# Patient Record
Sex: FEMALE | Race: WHITE | NOT HISPANIC OR LATINO | Employment: STUDENT | URBAN - METROPOLITAN AREA
[De-identification: names, ages, dates, MRNs, and addresses within clinical notes are randomized per-mention and may not be internally consistent; named-entity substitution may affect disease eponyms.]

---
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
## 2019-01-31 ENCOUNTER — OFFICE VISIT (OUTPATIENT)
Dept: URGENT CARE | Facility: CLINIC | Age: 19
End: 2019-01-31
Payer: MEDICAID

## 2019-01-31 VITALS
HEART RATE: 82 BPM | TEMPERATURE: 99 F | BODY MASS INDEX: 23.05 KG/M2 | HEIGHT: 64 IN | SYSTOLIC BLOOD PRESSURE: 112 MMHG | DIASTOLIC BLOOD PRESSURE: 72 MMHG | WEIGHT: 135 LBS

## 2019-01-31 DIAGNOSIS — S99.922A FOOT INJURY, LEFT, INITIAL ENCOUNTER: Primary | ICD-10-CM

## 2019-01-31 DIAGNOSIS — L03.032 CELLULITIS OF SMALL TOE OF LEFT FOOT: ICD-10-CM

## 2019-01-31 LAB
B-HCG UR QL: NEGATIVE
CTP QC/QA: YES

## 2019-01-31 PROCEDURE — 81025 POCT URINE PREGNANCY: ICD-10-PCS | Mod: S$GLB,,, | Performed by: FAMILY MEDICINE

## 2019-01-31 PROCEDURE — 99214 OFFICE O/P EST MOD 30 MIN: CPT | Mod: S$GLB,,, | Performed by: FAMILY MEDICINE

## 2019-01-31 PROCEDURE — 73660 X-RAY EXAM OF TOE(S): CPT | Mod: LT,S$GLB,, | Performed by: RADIOLOGY

## 2019-01-31 PROCEDURE — 99214 PR OFFICE/OUTPT VISIT, EST, LEVL IV, 30-39 MIN: ICD-10-PCS | Mod: S$GLB,,, | Performed by: FAMILY MEDICINE

## 2019-01-31 PROCEDURE — 73660 XR TOE 2 OR MORE VIEWS LEFT: ICD-10-PCS | Mod: LT,S$GLB,, | Performed by: RADIOLOGY

## 2019-01-31 PROCEDURE — 81025 URINE PREGNANCY TEST: CPT | Mod: S$GLB,,, | Performed by: FAMILY MEDICINE

## 2019-01-31 RX ORDER — CEPHALEXIN 500 MG/1
500 CAPSULE ORAL EVERY 12 HOURS
Qty: 40 CAPSULE | Refills: 0 | Status: SHIPPED | OUTPATIENT
Start: 2019-01-31 | End: 2019-02-10

## 2019-01-31 NOTE — LETTER
January 31, 2019      [facility] [address], [address]  [facility] [address]  Phone: [phone]  Fax: [phone]       Patient: Fariba Johnson   YOB: 2000  Date of Visit: 01/31/2019    To Whom It May Concern:    Jules Johnson  was at [facility] on 01/31/2019. Please excuse her from work/school from 01/31/19-02/01/19. If you have any questions or concerns, or if I can be of further assistance, please do not hesitate to contact me.    Sincerely,    Suzette Dietrich MA

## 2019-02-01 NOTE — PROGRESS NOTES
"Subjective:       Patient ID: Fariba Johnson is a 18 y.o. female.    Vitals:  height is 5' 4" (1.626 m) and weight is 61.2 kg (135 lb). Her oral temperature is 99.4 °F (37.4 °C). Her blood pressure is 112/72 and her pulse is 82.     Chief Complaint: Toe Pain    Fariba was walking in a yard last night and her foot got stuck and she hurt her pinky toe on her left foot.      Toe Pain    The incident occurred 12 to 24 hours ago. The incident occurred in the yard. The injury mechanism was a direct blow. The pain is present in the left foot. The quality of the pain is described as aching. The pain is at a severity of 7/10. The pain is mild. The pain has been constant since onset. Associated symptoms include an inability to bear weight. It is unknown if a foreign body is present. The symptoms are aggravated by movement and palpation. She has tried nothing for the symptoms.       Constitution: Negative for fatigue.   HENT: Negative for facial swelling and facial trauma.    Neck: Negative for neck stiffness.   Cardiovascular: Negative for chest trauma.   Eyes: Negative for eye trauma, double vision and blurred vision.   Gastrointestinal: Negative for abdominal trauma, abdominal pain and rectal bleeding.   Genitourinary: Negative for hematuria, missed menses, genital trauma and pelvic pain.   Musculoskeletal: Positive for pain and trauma. Negative for joint swelling and abnormal ROM of joint.   Skin: Negative for color change, wound, abrasion, laceration and bruising.   Neurological: Negative for dizziness, history of vertigo, light-headedness, coordination disturbances, altered mental status and loss of consciousness.   Hematologic/Lymphatic: Negative for history of bleeding disorder.   Psychiatric/Behavioral: Negative for altered mental status.       Objective:      Physical Exam   Constitutional: She is oriented to person, place, and time. She appears well-developed and well-nourished. She is cooperative.  Non-toxic " appearance. She does not appear ill. No distress.   HENT:   Head: Normocephalic and atraumatic.   Right Ear: Hearing, tympanic membrane, external ear and ear canal normal.   Left Ear: Hearing, tympanic membrane, external ear and ear canal normal.   Nose: Nose normal. No mucosal edema, rhinorrhea or nasal deformity. No epistaxis. Right sinus exhibits no maxillary sinus tenderness and no frontal sinus tenderness. Left sinus exhibits no maxillary sinus tenderness and no frontal sinus tenderness.   Mouth/Throat: Uvula is midline, oropharynx is clear and moist and mucous membranes are normal. No trismus in the jaw. Normal dentition. No uvula swelling. No posterior oropharyngeal erythema.   Eyes: Conjunctivae and lids are normal. Right eye exhibits no discharge. Left eye exhibits no discharge. No scleral icterus.   Sclera clear bilat   Neck: Trachea normal, normal range of motion, full passive range of motion without pain and phonation normal. Neck supple.   Cardiovascular: Normal rate, regular rhythm, normal heart sounds, intact distal pulses and normal pulses.   Pulmonary/Chest: Effort normal and breath sounds normal. No respiratory distress.   Abdominal: Soft. Normal appearance and bowel sounds are normal. She exhibits no distension, no pulsatile midline mass and no mass. There is no tenderness.   Musculoskeletal: Normal range of motion. She exhibits no edema or deformity.   Mild erythema and induration of right 5th toe small open wound at medial nail fold.  No deformity no purulence noted   Neurological: She is alert and oriented to person, place, and time. She exhibits normal muscle tone. Coordination normal.   Skin: Skin is warm, dry and intact. She is not diaphoretic. No pallor.   Psychiatric: She has a normal mood and affect. Her speech is normal and behavior is normal. Judgment and thought content normal. Cognition and memory are normal.   Nursing note and vitals reviewed.      Assessment:       1. Foot injury,  left, initial encounter    2. Cellulitis of small toe of left foot        Plan:         Foot injury, left, initial encounter  -     POCT urine pregnancy  -     X-Ray Toe 2 or More Views Left; Future; Expected date: 01/31/2019    Cellulitis of small toe of left foot    Other orders  -     cephALEXin (KEFLEX) 500 MG capsule; Take 1 capsule (500 mg total) by mouth every 12 (twelve) hours. for 10 days  Dispense: 40 capsule; Refill: 0     X-ray of toe shows no fracture or dislocation.  UPT negative.  Cephalexin times 10 days cellulitis precautions follow-up if no improvement 3-5 days.

## 2019-02-01 NOTE — PATIENT INSTRUCTIONS
Discharge Instructions for Cellulitis  You have been diagnosed with cellulitis. This is an infection in the deepest layer of the skin. In some cases, the infection also affects the muscle. Cellulitis is caused by bacteria. The bacteria can enter the body through broken skin. This can happen with a cut, scratch, animal bite, or an insect bite that has been scratched. You may have been treated in the hospital with antibiotics and fluids. You will likely be given a prescription for antibiotics to take at home. This sheet will help you take care of yourself at home.  Home care  When you are home:  · Take the prescribed antibiotic medicine you are given as directed until it is gone. Take it even if you feel better. It treats the infection and stops it from returning. Not taking all the medicine can make future infections hard to treat.  · Keep the infected area clean.  · When possible, raise the infected area above the level of your heart. This helps keep swelling down.  · Talk with your healthcare provider if you are in pain. Ask what kind of over-the-counter medicine you can take for pain.  · Apply clean bandages as advised.  · Take your temperature once a day for a week.  · Wash your hands often to prevent spreading the infection.  In the future, wash your hands before and after you touch cuts, scratches, or bandages. This will help prevent infection.   When to call your healthcare provider  Call your healthcare provider immediately if you have any of the following:  · Difficulty or pain when moving the joints above or below the infected area  · Discharge or pus draining from the area  · Fever of 100.4°F (38°C) or higher, or as directed by your healthcare provider  · Pain that gets worse in or around the infected   · Redness that gets worse in or around the infected area, particularly if the area of redness expands to a wider area  · Shaking chills  · Swelling of the infected area  · Vomiting   Date Last Reviewed:  8/1/2016  © 4503-4384 [employer]. [address] [place] [address], [place] [address]. All rights reserved. This information is not intended as a substitute for professional medical care. Always follow your healthcare professional's instructions.

## 2021-05-19 ENCOUNTER — CLINICAL SUPPORT (OUTPATIENT)
Dept: URGENT CARE | Facility: CLINIC | Age: 21
End: 2021-05-19
Payer: MEDICAID

## 2021-05-19 VITALS
HEART RATE: 80 BPM | RESPIRATION RATE: 16 BRPM | DIASTOLIC BLOOD PRESSURE: 74 MMHG | TEMPERATURE: 98 F | WEIGHT: 142 LBS | SYSTOLIC BLOOD PRESSURE: 124 MMHG | BODY MASS INDEX: 24.24 KG/M2 | HEIGHT: 64 IN | OXYGEN SATURATION: 98 %

## 2021-05-19 DIAGNOSIS — R21 RASH AND NONSPECIFIC SKIN ERUPTION: Primary | ICD-10-CM

## 2021-05-19 PROCEDURE — 99214 OFFICE O/P EST MOD 30 MIN: CPT | Mod: S$GLB,,, | Performed by: PHYSICIAN ASSISTANT

## 2021-05-19 PROCEDURE — 99214 PR OFFICE/OUTPT VISIT, EST, LEVL IV, 30-39 MIN: ICD-10-PCS | Mod: S$GLB,,, | Performed by: PHYSICIAN ASSISTANT

## 2021-07-08 ENCOUNTER — OFFICE VISIT (OUTPATIENT)
Dept: URGENT CARE | Facility: CLINIC | Age: 21
End: 2021-07-08
Payer: MEDICAID

## 2021-07-08 VITALS
HEART RATE: 93 BPM | DIASTOLIC BLOOD PRESSURE: 77 MMHG | BODY MASS INDEX: 24.8 KG/M2 | SYSTOLIC BLOOD PRESSURE: 126 MMHG | WEIGHT: 140 LBS | OXYGEN SATURATION: 98 % | HEIGHT: 63 IN | TEMPERATURE: 98 F | RESPIRATION RATE: 16 BRPM

## 2021-07-08 DIAGNOSIS — U07.1 COVID-19 VIRUS DETECTED: ICD-10-CM

## 2021-07-08 DIAGNOSIS — U07.1 COVID-19 VIRUS INFECTION: Primary | ICD-10-CM

## 2021-07-08 DIAGNOSIS — R05.9 COUGH: ICD-10-CM

## 2021-07-08 LAB
CTP QC/QA: YES
SARS-COV-2 RDRP RESP QL NAA+PROBE: POSITIVE

## 2021-07-08 PROCEDURE — U0002: ICD-10-PCS | Mod: QW,S$GLB,, | Performed by: PHYSICIAN ASSISTANT

## 2021-07-08 PROCEDURE — 99214 OFFICE O/P EST MOD 30 MIN: CPT | Mod: S$GLB,,, | Performed by: PHYSICIAN ASSISTANT

## 2021-07-08 PROCEDURE — 99214 PR OFFICE/OUTPT VISIT, EST, LEVL IV, 30-39 MIN: ICD-10-PCS | Mod: S$GLB,,, | Performed by: PHYSICIAN ASSISTANT

## 2021-07-08 PROCEDURE — U0002 COVID-19 LAB TEST NON-CDC: HCPCS | Mod: QW,S$GLB,, | Performed by: PHYSICIAN ASSISTANT

## 2021-11-28 ENCOUNTER — OFFICE VISIT (OUTPATIENT)
Dept: URGENT CARE | Facility: CLINIC | Age: 21
End: 2021-11-28
Payer: MEDICAID

## 2021-11-28 VITALS
WEIGHT: 145 LBS | SYSTOLIC BLOOD PRESSURE: 122 MMHG | OXYGEN SATURATION: 97 % | BODY MASS INDEX: 24.75 KG/M2 | DIASTOLIC BLOOD PRESSURE: 73 MMHG | TEMPERATURE: 98 F | HEART RATE: 77 BPM | HEIGHT: 64 IN | RESPIRATION RATE: 16 BRPM

## 2021-11-28 DIAGNOSIS — J34.89 RHINORRHEA: Primary | ICD-10-CM

## 2021-11-28 DIAGNOSIS — J02.9 SORE THROAT: ICD-10-CM

## 2021-11-28 LAB
CTP QC/QA: YES
CTP QC/QA: YES
POC MOLECULAR INFLUENZA A AGN: NEGATIVE
POC MOLECULAR INFLUENZA B AGN: NEGATIVE
SARS-COV-2 RDRP RESP QL NAA+PROBE: NEGATIVE

## 2021-11-28 PROCEDURE — 87502 POCT INFLUENZA A/B MOLECULAR: ICD-10-PCS | Mod: QW,S$GLB,, | Performed by: PHYSICIAN ASSISTANT

## 2021-11-28 PROCEDURE — U0002 COVID-19 LAB TEST NON-CDC: HCPCS | Mod: QW,S$GLB,, | Performed by: PHYSICIAN ASSISTANT

## 2021-11-28 PROCEDURE — 87502 INFLUENZA DNA AMP PROBE: CPT | Mod: QW,S$GLB,, | Performed by: PHYSICIAN ASSISTANT

## 2021-11-28 PROCEDURE — U0002: ICD-10-PCS | Mod: QW,S$GLB,, | Performed by: PHYSICIAN ASSISTANT

## 2021-11-28 PROCEDURE — 99213 OFFICE O/P EST LOW 20 MIN: CPT | Mod: S$GLB,,, | Performed by: PHYSICIAN ASSISTANT

## 2021-11-28 PROCEDURE — 99213 PR OFFICE/OUTPT VISIT, EST, LEVL III, 20-29 MIN: ICD-10-PCS | Mod: S$GLB,,, | Performed by: PHYSICIAN ASSISTANT

## 2021-11-28 RX ORDER — AZELASTINE 1 MG/ML
1 SPRAY, METERED NASAL 2 TIMES DAILY
Qty: 30 ML | Refills: 0 | Status: SHIPPED | OUTPATIENT
Start: 2021-11-28 | End: 2022-02-23 | Stop reason: CLARIF

## 2022-09-02 ENCOUNTER — OFFICE VISIT (OUTPATIENT)
Dept: URGENT CARE | Facility: CLINIC | Age: 22
End: 2022-09-02
Payer: MEDICAID

## 2022-09-02 VITALS
HEART RATE: 74 BPM | RESPIRATION RATE: 16 BRPM | OXYGEN SATURATION: 100 % | WEIGHT: 149 LBS | HEIGHT: 63 IN | TEMPERATURE: 98 F | SYSTOLIC BLOOD PRESSURE: 136 MMHG | DIASTOLIC BLOOD PRESSURE: 82 MMHG | BODY MASS INDEX: 26.4 KG/M2

## 2022-09-02 DIAGNOSIS — R09.82 POSTNASAL DRIP: ICD-10-CM

## 2022-09-02 DIAGNOSIS — J32.9 SINUSITIS, UNSPECIFIED CHRONICITY, UNSPECIFIED LOCATION: Primary | ICD-10-CM

## 2022-09-02 DIAGNOSIS — R09.81 SINUS CONGESTION: ICD-10-CM

## 2022-09-02 LAB
CTP QC/QA: YES
SARS-COV-2 RDRP RESP QL NAA+PROBE: NEGATIVE

## 2022-09-02 PROCEDURE — 3075F SYST BP GE 130 - 139MM HG: CPT | Mod: CPTII,S$GLB,, | Performed by: PHYSICIAN ASSISTANT

## 2022-09-02 PROCEDURE — 99213 OFFICE O/P EST LOW 20 MIN: CPT | Mod: S$GLB,,, | Performed by: PHYSICIAN ASSISTANT

## 2022-09-02 PROCEDURE — 3075F PR MOST RECENT SYSTOLIC BLOOD PRESS GE 130-139MM HG: ICD-10-PCS | Mod: CPTII,S$GLB,, | Performed by: PHYSICIAN ASSISTANT

## 2022-09-02 PROCEDURE — 1159F PR MEDICATION LIST DOCUMENTED IN MEDICAL RECORD: ICD-10-PCS | Mod: CPTII,S$GLB,, | Performed by: PHYSICIAN ASSISTANT

## 2022-09-02 PROCEDURE — 3079F DIAST BP 80-89 MM HG: CPT | Mod: CPTII,S$GLB,, | Performed by: PHYSICIAN ASSISTANT

## 2022-09-02 PROCEDURE — 1159F MED LIST DOCD IN RCRD: CPT | Mod: CPTII,S$GLB,, | Performed by: PHYSICIAN ASSISTANT

## 2022-09-02 PROCEDURE — 1160F PR REVIEW ALL MEDS BY PRESCRIBER/CLIN PHARMACIST DOCUMENTED: ICD-10-PCS | Mod: CPTII,S$GLB,, | Performed by: PHYSICIAN ASSISTANT

## 2022-09-02 PROCEDURE — 3008F BODY MASS INDEX DOCD: CPT | Mod: CPTII,S$GLB,, | Performed by: PHYSICIAN ASSISTANT

## 2022-09-02 PROCEDURE — 3079F PR MOST RECENT DIASTOLIC BLOOD PRESSURE 80-89 MM HG: ICD-10-PCS | Mod: CPTII,S$GLB,, | Performed by: PHYSICIAN ASSISTANT

## 2022-09-02 PROCEDURE — U0002 COVID-19 LAB TEST NON-CDC: HCPCS | Mod: QW,S$GLB,, | Performed by: PHYSICIAN ASSISTANT

## 2022-09-02 PROCEDURE — 99213 PR OFFICE/OUTPT VISIT, EST, LEVL III, 20-29 MIN: ICD-10-PCS | Mod: S$GLB,,, | Performed by: PHYSICIAN ASSISTANT

## 2022-09-02 PROCEDURE — U0002: ICD-10-PCS | Mod: QW,S$GLB,, | Performed by: PHYSICIAN ASSISTANT

## 2022-09-02 PROCEDURE — 1160F RVW MEDS BY RX/DR IN RCRD: CPT | Mod: CPTII,S$GLB,, | Performed by: PHYSICIAN ASSISTANT

## 2022-09-02 PROCEDURE — 3008F PR BODY MASS INDEX (BMI) DOCUMENTED: ICD-10-PCS | Mod: CPTII,S$GLB,, | Performed by: PHYSICIAN ASSISTANT

## 2022-09-02 RX ORDER — PREDNISONE 20 MG/1
TABLET ORAL
Qty: 10 TABLET | Refills: 0 | Status: SHIPPED | OUTPATIENT
Start: 2022-09-02

## 2022-09-02 RX ORDER — AMOXICILLIN AND CLAVULANATE POTASSIUM 875; 125 MG/1; MG/1
1 TABLET, FILM COATED ORAL 2 TIMES DAILY
Qty: 20 TABLET | Refills: 0 | Status: SHIPPED | OUTPATIENT
Start: 2022-09-02 | End: 2022-09-12

## 2022-09-02 RX ORDER — CETIRIZINE HYDROCHLORIDE 10 MG/1
10 TABLET ORAL DAILY
Qty: 30 TABLET | Refills: 3 | Status: SHIPPED | OUTPATIENT
Start: 2022-09-02

## 2022-09-02 RX ORDER — FLUTICASONE PROPIONATE 50 MCG
1 SPRAY, SUSPENSION (ML) NASAL 2 TIMES DAILY
Qty: 16 ML | Refills: 3 | Status: SHIPPED | OUTPATIENT
Start: 2022-09-02

## 2022-09-02 NOTE — PATIENT INSTRUCTIONS
You must understand that you've received an Urgent Care treatment only and that you may be released before all your medical problems are known or treated. You, the patient, will arrange for follow up care as instructed.  Follow up with your PCP or specialty clinic as directed if not improved or as needed. You can call [phone] to schedule an appointment with the appropriate provider.  If your condition worsens we recommend that you receive another evaluation at the Emergency Department for any concerns or worsening of condition.  Patient aware and verbalized understanding.    Reviewed COVID-19 results with patient.  Counseled patient and answered questions in regards to COVID-19 testing.  Advised patient to go home, treat symptoms with over-the-counter (OTC) medications and avoid contact with others at this time.  Increase fluids and rest is important.  Humidifier use at home.  OTC Claritin or Zyrtec or Allegra daily as needed for nasal congestion/postnasal drip/allergies.  OTC Flonase Nasal Spray daily as needed for nasal congestion/postnasal drip/allergies.  Advised patient to take OTC VITAMIN C and VITAMIN D and ZINC unless contraindicated as discussed.  Alternate OTC Tylenol and Ibuprofen unless contraindicated every 4-6 hours as needed for pain, headache, fever, etc.  Info given for virtual visit, covid 19 information line, state info line.   Advised patient to follow-up with PCP and/or Specialist for further evaluation as needed.   Strict ER precautions given to patient.  Follow local/state guidelines per covid emergency.   Patient aware, verbalized understanding and agreed with plan of care.    IF NOT IMPROVING, FOLLOW UP WITH VIRTUAL ONLINE VISIT WITH A PROVIDER 24/7 - FOR MORE INFORMATION OR TO DOWNLOAD THE REFUGIO, VISIT [facility] ANYWHERE CARE AT [email or web address]  FOR 24/7 NURSE ADVICE, CALL [phone]  FOR COVID 19 RELATED QUESTIONS, CALL the [facility] covid hotline: [phone]  [place] FOR UP TO  DATE INFORMATION: Text or dial 211, test keyword LACBRANDOND -211 OR DIAL 211    HELPFUL EXTERNAL RESOURCES:  OFFICE OF PUBLIC HEALTH: [place] - http://ldh.la.gov/ and 1-644.980.1754  CENTER FOR DISEASE CONTROL - https://www.cdc.gov/   WORLD HEALTH ORGANIZATION (WHO) - https://www.who.int/   CDC WHEN TO QUARANTINE - https://www.cdc.gov/coronavirus/2019-ncov/if-you-are-sick/quarantine.html     INFO ABOUT [facility] COVID-19 RAPID TESTING:  This test utilizes isothermal nucleic acid amplification technology to detect the SARS-CoV-2 RdRp nucleic acid segment.   The analytical sensitivity (limit of detection) is 125 genome equivalents/mL.   A POSITIVE result implies infection with the SARS-CoV-2 virus; the patient is presumed to be contagious.     A NEGATIVE result means that SARS-CoV-2 nucleic acids are not present above the limit of detection.   A NEGATIVE result should be treated as presumptive. It does not rule out the possibility of COVID-19 and should not be the sole basis for treatment decisions.   This test is only for use under the Food and Drug Administration s Emergency Use Authorization (EUA).   Commercial kits are provided by [employer]. Performance characteristics of the EUA have been independently verified by [facility] Department of Pathology and Laboratory Medicine.   _________________________________________________________________   The authorized Fact Sheet for Healthcare Providers and the authorized Fact Sheet for Patients of the ID NOW COVID-19 are available on the FDA website:   https://www.fda.gov/media/823875/download  https://www.fda.gov/media/919005/download

## 2022-09-02 NOTE — LETTER
September 2, 2022      [facility]  [address] [place] [address], [address]  [place] [address]  Phone: [phone]  Fax: [phone]       Patient: Fariba Johnson   YOB: 2000  Date of Visit: 09/02/2022    To Whom It May Concern:    Jules Johnson  was at [facility] on 09/02/2022. Please excuse patient for days missed from work/school. The patient may return to work/school on 09/06/2022. If you have any questions or concerns, or if I can be of further assistance, please do not hesitate to contact me.    Sincerely,      Susana Mina PA-C

## 2022-09-02 NOTE — PROGRESS NOTES
"Subjective:       Patient ID: Fariba Johnson is a 22 y.o. female.    Vitals:  height is 5' 3" (1.6 m) and weight is 67.6 kg (149 lb). Her temperature is 98 °F (36.7 °C). Her blood pressure is 136/82 and her pulse is 74. Her respiration is 16 and oxygen saturation is 100%.     Chief Complaint: Cough    Cough  This is a new problem. The current episode started 1 to 4 weeks ago (> 10 days). The problem has been gradually worsening. The problem occurs constantly. The cough is Productive of sputum. Associated symptoms include nasal congestion, postnasal drip, rhinorrhea and a sore throat. Pertinent negatives include no chest pain, chills, ear congestion, ear pain, eye redness, fever, headaches, heartburn, hemoptysis, myalgias, rash, shortness of breath, sweats, weight loss or wheezing. Nothing aggravates the symptoms. She has tried OTC cough suppressant for the symptoms. The treatment provided no relief. There is no history of asthma, bronchiectasis, bronchitis, COPD, emphysema, environmental allergies or pneumonia.   Constitution: Negative for chills, sweating, fatigue and fever.   HENT:  Positive for congestion, postnasal drip, sinus pain, sinus pressure and sore throat. Negative for ear pain, drooling, nosebleeds, foreign body in nose, trouble swallowing and voice change.    Neck: Negative for neck pain, neck stiffness, painful lymph nodes and neck swelling.   Cardiovascular:  Negative for chest pain, leg swelling, palpitations, sob on exertion and passing out.   Eyes:  Negative for eye pain, eye redness, photophobia, double vision, blurred vision and eyelid swelling.   Respiratory:  Positive for cough and sputum production. Negative for chest tightness, bloody sputum, shortness of breath, stridor and wheezing.    Gastrointestinal:  Negative for abdominal pain, abdominal bloating, nausea, vomiting, constipation, diarrhea and heartburn.   Musculoskeletal:  Negative for joint pain, joint swelling, abnormal ROM of joint, " back pain, muscle cramps and muscle ache.   Skin:  Negative for rash and hives.   Allergic/Immunologic: Negative for environmental allergies, seasonal allergies, food allergies, hives, itching and sneezing.   Neurological:  Negative for dizziness, light-headedness, passing out, facial drooping, speech difficulty, loss of balance, headaches, altered mental status, loss of consciousness and seizures.   Hematologic/Lymphatic: Negative for swollen lymph nodes.   Psychiatric/Behavioral:  Negative for altered mental status and nervous/anxious. The patient is not nervous/anxious.      Objective:      Physical Exam   Constitutional: She is oriented to person, place, and time. She appears well-developed. She is cooperative.  Non-toxic appearance. She does not appear ill. No distress.   HENT:   Head: Normocephalic and atraumatic.   Ears:   Right Ear: Hearing, tympanic membrane, external ear and ear canal normal.   Left Ear: Hearing, tympanic membrane, external ear and ear canal normal.   Nose: Mucosal edema and rhinorrhea present. No nasal deformity. No epistaxis. Right sinus exhibits maxillary sinus tenderness and frontal sinus tenderness. Left sinus exhibits maxillary sinus tenderness and frontal sinus tenderness.   Mouth/Throat: Uvula is midline and mucous membranes are normal. No trismus in the jaw. Normal dentition. No uvula swelling. Posterior oropharyngeal erythema and cobblestoning present. No oropharyngeal exudate, posterior oropharyngeal edema or tonsillar abscesses. No tonsillar exudate.   Eyes: Conjunctivae and lids are normal. No scleral icterus.   Neck: Trachea normal and phonation normal. Neck supple. No edema present. No erythema present. No neck rigidity present.   Cardiovascular: Normal rate, regular rhythm, normal heart sounds and normal pulses.   Pulmonary/Chest: Effort normal and breath sounds normal. No accessory muscle usage or stridor. No respiratory distress. She has no decreased breath sounds. She  has no wheezes. She has no rhonchi. She has no rales.   Abdominal: Normal appearance.   Musculoskeletal: Normal range of motion.         General: No deformity. Normal range of motion.   Lymphadenopathy:     She has no cervical adenopathy.   Neurological: She is alert and oriented to person, place, and time. She exhibits normal muscle tone. Coordination normal.   Skin: Skin is warm, dry, intact, not diaphoretic, not pale and no rash. Capillary refill takes less than 2 seconds.   Psychiatric: Her speech is normal and behavior is normal. Judgment and thought content normal.   Nursing note and vitals reviewed.      Results for orders placed or performed in visit on 09/02/22   POCT COVID-19 Rapid Screening   Result Value Ref Range    POC Rapid COVID Negative Negative     Acceptable Yes        Assessment:       1. Sinusitis, unspecified chronicity, unspecified location    2. Sinus congestion    3. Postnasal drip          Plan:     Discussed COVID-19 results with patient. Advised close follow-up with PCP and/or Specialist for further evaluation as needed. ER precautions given to patient as well. Patient aware, verbalized understanding and agreed with plan of care.    Sinusitis, unspecified chronicity, unspecified location    Sinus congestion  -     POCT COVID-19 Rapid Screening  -     cetirizine (ZYRTEC) 10 MG tablet; Take 1 tablet (10 mg total) by mouth once daily.  Dispense: 30 tablet; Refill: 3  -     fluticasone propionate (FLONASE ALLERGY RELIEF) 50 mcg/actuation nasal spray; 1 spray (50 mcg total) by Each Nostril route 2 (two) times daily.  Dispense: 16 mL; Refill: 3    Postnasal drip  -     POCT COVID-19 Rapid Screening  -     cetirizine (ZYRTEC) 10 MG tablet; Take 1 tablet (10 mg total) by mouth once daily.  Dispense: 30 tablet; Refill: 3  -     fluticasone propionate (FLONASE ALLERGY RELIEF) 50 mcg/actuation nasal spray; 1 spray (50 mcg total) by Each Nostril route 2 (two) times daily.  Dispense: 16  mL; Refill: 3    Other orders  -     amoxicillin-clavulanate 875-125mg (AUGMENTIN) 875-125 mg per tablet; Take 1 tablet by mouth 2 (two) times daily. for 10 days  Dispense: 20 tablet; Refill: 0  -     predniSONE (DELTASONE) 20 MG tablet; Take 40mg (2 tablets) x 2 days, 30mg (1.5 tablets) x 2 days, 20mg (1 tablet) x 2 days, 10mg (0.5 tablet) x 2 days.  Dispense: 10 tablet; Refill: 0       Patient Instructions   You must understand that you've received an Urgent Care treatment only and that you may be released before all your medical problems are known or treated. You, the patient, will arrange for follow up care as instructed.  Follow up with your PCP or specialty clinic as directed if not improved or as needed. You can call [phone] to schedule an appointment with the appropriate provider.  If your condition worsens we recommend that you receive another evaluation at the Emergency Department for any concerns or worsening of condition.  Patient aware and verbalized understanding.    Reviewed COVID-19 results with patient.  Counseled patient and answered questions in regards to COVID-19 testing.  Advised patient to go home, treat symptoms with over-the-counter (OTC) medications and avoid contact with others at this time.  Increase fluids and rest is important.  Humidifier use at home.  OTC Claritin or Zyrtec or Allegra daily as needed for nasal congestion/postnasal drip/allergies.  OTC Flonase Nasal Spray daily as needed for nasal congestion/postnasal drip/allergies.  Advised patient to take OTC VITAMIN C and VITAMIN D and ZINC unless contraindicated as discussed.  Alternate OTC Tylenol and Ibuprofen unless contraindicated every 4-6 hours as needed for pain, headache, fever, etc.  Info given for virtual visit, covid 19 information line, state info line.   Advised patient to follow-up with PCP and/or Specialist for further evaluation as needed.   Strict ER precautions given to patient.  Follow local/state  guidelines per covid emergency.   Patient aware, verbalized understanding and agreed with plan of care.    IF NOT IMPROVING, FOLLOW UP WITH VIRTUAL ONLINE VISIT WITH A PROVIDER 24/7 - FOR MORE INFORMATION OR TO DOWNLOAD THE REFUGIO, VISIT [facility] ANYWHERE CARE AT [email or web address]  FOR 24/7 NURSE ADVICE, CALL [phone]  FOR COVID 19 RELATED QUESTIONS, CALL the [facility] covid hotline: [phone]  [place] FOR UP TO DATE INFORMATION: Text or dial 211, test keyword [id number] OR DIAL 211    HELPFUL EXTERNAL RESOURCES:  OFFICE OF PUBLIC HEALTH: [place] - http://ldh.[place].gov/ and 1-800.769.3414  [facility] - https://www.cdc.gov/   WORLD HEALTH ORGANIZATION (WHO) - https://www.who.int/   CDC WHEN TO QUARANTINE - https://www.cdc.gov/coronavirus/2019-ncov/if-you-are-sick/quarantine.html     INFO ABOUT [facility] COVID-19 RAPID TESTING:  This test utilizes isothermal nucleic acid amplification technology to detect the SARS-CoV-2 RdRp nucleic acid segment.   The analytical sensitivity (limit of detection) is 125 genome equivalents/mL.   A POSITIVE result implies infection with the SARS-CoV-2 virus; the patient is presumed to be contagious.     A NEGATIVE result means that SARS-CoV-2 nucleic acids are not present above the limit of detection.   A NEGATIVE result should be treated as presumptive. It does not rule out the possibility of COVID-19 and should not be the sole basis for treatment decisions.   This test is only for use under the Food and Drug Administration s Emergency Use Authorization (EUA).   Commercial kits are provided by [facility]. Performance characteristics of the EUA have been independently verified by [facility] Department of Pathology and Laboratory Medicine.   _________________________________________________________________   The authorized Fact Sheet for Healthcare Providers and the authorized Fact Sheet for Patients of the ID NOW COVID-19 are available on  the FDA website:   https://www.fda.gov/media/825080/download  https://www.fda.gov/media/118291/download